# Patient Record
Sex: MALE | Race: WHITE | NOT HISPANIC OR LATINO | Employment: FULL TIME | URBAN - METROPOLITAN AREA
[De-identification: names, ages, dates, MRNs, and addresses within clinical notes are randomized per-mention and may not be internally consistent; named-entity substitution may affect disease eponyms.]

---

## 2023-03-07 PROBLEM — F33.0 MILD RECURRENT MAJOR DEPRESSION (CMS-HCC): Status: ACTIVE | Noted: 2019-10-25

## 2023-03-07 PROBLEM — F41.1 GENERALIZED ANXIETY DISORDER: Status: ACTIVE | Noted: 2019-10-25

## 2023-03-07 RX ORDER — ESCITALOPRAM OXALATE 10 MG/1
10 TABLET ORAL DAILY
COMMUNITY
Start: 2020-02-14 | End: 2023-03-08 | Stop reason: SDUPTHER

## 2023-03-08 ENCOUNTER — OFFICE VISIT (OUTPATIENT)
Dept: PRIMARY CARE | Facility: CLINIC | Age: 23
End: 2023-03-08
Payer: COMMERCIAL

## 2023-03-08 VITALS
HEIGHT: 69 IN | DIASTOLIC BLOOD PRESSURE: 70 MMHG | TEMPERATURE: 97.6 F | WEIGHT: 135 LBS | HEART RATE: 80 BPM | SYSTOLIC BLOOD PRESSURE: 112 MMHG | BODY MASS INDEX: 19.99 KG/M2 | OXYGEN SATURATION: 98 %

## 2023-03-08 DIAGNOSIS — F33.0 MILD RECURRENT MAJOR DEPRESSION (CMS-HCC): Primary | ICD-10-CM

## 2023-03-08 DIAGNOSIS — F41.1 GENERALIZED ANXIETY DISORDER: ICD-10-CM

## 2023-03-08 PROCEDURE — 99214 OFFICE O/P EST MOD 30 MIN: CPT | Performed by: FAMILY MEDICINE

## 2023-03-08 PROCEDURE — 1036F TOBACCO NON-USER: CPT | Performed by: FAMILY MEDICINE

## 2023-03-08 RX ORDER — ESCITALOPRAM OXALATE 10 MG/1
10 TABLET ORAL DAILY
Qty: 90 TABLET | Refills: 1 | Status: SHIPPED | OUTPATIENT
Start: 2023-03-08 | End: 2023-08-03 | Stop reason: SDUPTHER

## 2023-03-08 ASSESSMENT — ENCOUNTER SYMPTOMS
FATIGUE: 0
DIARRHEA: 0
CONSTIPATION: 0
DEPRESSION: 1
SHORTNESS OF BREATH: 0
NAUSEA: 0
HEADACHES: 0
DIZZINESS: 0
ABDOMINAL PAIN: 0

## 2023-03-08 NOTE — PATIENT INSTRUCTIONS
Continue your medication    Monitor your weight. If you continue to decrease in weight, follow up for recheck and possible blood work

## 2023-03-08 NOTE — PROGRESS NOTES
"Subjective   Patient ID: Mitul Venegas is a 22 y.o. male who presents for Depression and Anxiety.    Mood: well controlled.   Wt loss: lost 13 lbs since last ov. Skipping breakfast b/c would cause some loose stool. No bloody stool.     Feeling well otherwise.     DepressionPatient is not experiencing: shortness of breath.      Anxiety  Patient reports no chest pain, dizziness, nausea or shortness of breath.            Review of Systems   Constitutional:  Negative for fatigue.   Respiratory:  Negative for shortness of breath.    Cardiovascular:  Negative for chest pain.   Gastrointestinal:  Negative for abdominal pain, constipation, diarrhea and nausea.   Neurological:  Negative for dizziness and headaches.   Psychiatric/Behavioral:  Positive for depression.        Objective   /70   Pulse 80   Temp 36.4 °C (97.6 °F)   Ht 1.74 m (5' 8.5\")   Wt 61.2 kg (135 lb)   SpO2 98%   BMI 20.23 kg/m²     Physical Exam  Vitals reviewed.   Eyes:      General: No scleral icterus.     Conjunctiva/sclera: Conjunctivae normal.   Neck:      Thyroid: No thyromegaly.   Cardiovascular:      Rate and Rhythm: Normal rate and regular rhythm.   Abdominal:      Palpations: Abdomen is soft.      Tenderness: There is no abdominal tenderness.   Musculoskeletal:      Cervical back: Normal range of motion.   Skin:     General: Skin is warm and dry.   Neurological:      Mental Status: He is alert.   Psychiatric:         Mood and Affect: Mood normal.         Assessment/Plan          "

## 2023-06-27 ENCOUNTER — OFFICE VISIT (OUTPATIENT)
Dept: PRIMARY CARE | Facility: CLINIC | Age: 23
End: 2023-06-27
Payer: COMMERCIAL

## 2023-06-27 VITALS
SYSTOLIC BLOOD PRESSURE: 98 MMHG | OXYGEN SATURATION: 100 % | HEART RATE: 72 BPM | BODY MASS INDEX: 19.93 KG/M2 | TEMPERATURE: 97.4 F | WEIGHT: 133 LBS | DIASTOLIC BLOOD PRESSURE: 66 MMHG

## 2023-06-27 DIAGNOSIS — F33.0 MILD RECURRENT MAJOR DEPRESSION (CMS-HCC): ICD-10-CM

## 2023-06-27 DIAGNOSIS — F42.9 OBSESSIVE-COMPULSIVE DISORDER, UNSPECIFIED TYPE: Primary | ICD-10-CM

## 2023-06-27 DIAGNOSIS — F41.1 GENERALIZED ANXIETY DISORDER: ICD-10-CM

## 2023-06-27 PROCEDURE — 99214 OFFICE O/P EST MOD 30 MIN: CPT | Performed by: FAMILY MEDICINE

## 2023-06-27 PROCEDURE — 1036F TOBACCO NON-USER: CPT | Performed by: FAMILY MEDICINE

## 2023-06-27 NOTE — PATIENT INSTRUCTIONS
You were referred to behavioral health    Increase your med to 15mg. Notify office if you are tolerating    Return in 4-6 weeks, sooner if needed

## 2023-06-27 NOTE — PROGRESS NOTES
"Subjective   Patient ID: Mitul Venegas is a 22 y.o. male who presents for Anxiety (Recheck. Discuss referral to psychiatrist. ).    HPI     Mood: fair control. Seeing counselor. Does not feel helping. No SI/HI. Feeling anxious and developed \"germ phobia.\" Has been washing hands more. Would like to see psych. Occ pt does not want to touch things due germ fear.appetite has been down. Eats 1 large meal a day. Often skips breakfast because wakes up late. Has good energy overall. Not currently working. Plans to go back to school in fall. Feeling down.     Review of Systems   Constitutional:  Negative for fatigue.   Eyes:  Negative for visual disturbance.   Respiratory:  Negative for shortness of breath.    Cardiovascular:  Negative for chest pain and palpitations.   Gastrointestinal:  Negative for abdominal pain, blood in stool, constipation, diarrhea, nausea and vomiting.   Endocrine: Negative for cold intolerance, heat intolerance, polydipsia, polyphagia and polyuria.   Genitourinary:  Negative for difficulty urinating and dysuria.   Musculoskeletal:  Negative for arthralgias and myalgias.   Skin:  Negative for rash.   Neurological:  Negative for dizziness and headaches.   Psychiatric/Behavioral:  Negative for sleep disturbance.        Objective   BP 98/66   Pulse 72   Temp 36.3 °C (97.4 °F) (Temporal)   Wt 60.3 kg (133 lb)   SpO2 100%   BMI 19.93 kg/m²     Physical Exam  Vitals and nursing note reviewed.   Constitutional:       General: He is not in acute distress.     Appearance: Normal appearance. He is not toxic-appearing.   HENT:      Head: Normocephalic.   Eyes:      General: No scleral icterus.     Pupils: Pupils are equal, round, and reactive to light.   Cardiovascular:      Rate and Rhythm: Normal rate and regular rhythm.      Pulses: Normal pulses.      Heart sounds: No murmur heard.  Pulmonary:      Effort: Pulmonary effort is normal. No respiratory distress.      Breath sounds: Normal breath " sounds.   Abdominal:      General: Bowel sounds are normal.      Palpations: Abdomen is soft.      Tenderness: There is no abdominal tenderness. There is no guarding.   Musculoskeletal:         General: No tenderness.      Cervical back: Neck supple.   Lymphadenopathy:      Cervical: No cervical adenopathy.   Skin:     General: Skin is warm.   Neurological:      General: No focal deficit present.      Mental Status: He is alert.      Cranial Nerves: No cranial nerve deficit.   Psychiatric:         Mood and Affect: Mood normal.         Assessment/Plan   Problem List Items Addressed This Visit          Mental Health    Generalized anxiety disorder    Relevant Orders    Referral to [facility]    Mild recurrent major depression (CMS/HCC)    Relevant Orders    Referral to [facility]     Other Visit Diagnoses       Obsessive-compulsive disorder, unspecified type    -  Primary    Relevant Orders    Referral to [facility]        Rec pt follow up with counseling. Referred to behavioral health for now. May need to see psych. Pt agrees to increase med for now. Pt only wants to increase to 15 mg. Rec regular meals

## 2023-06-28 ASSESSMENT — ENCOUNTER SYMPTOMS
VOMITING: 0
ABDOMINAL PAIN: 0
DIFFICULTY URINATING: 0
ARTHRALGIAS: 0
SLEEP DISTURBANCE: 0
POLYDIPSIA: 0
HEADACHES: 0
MYALGIAS: 0
DIARRHEA: 0
DIZZINESS: 0
BLOOD IN STOOL: 0
NAUSEA: 0
DYSURIA: 0
POLYPHAGIA: 0
SHORTNESS OF BREATH: 0
PALPITATIONS: 0
CONSTIPATION: 0
FATIGUE: 0

## 2023-08-03 DIAGNOSIS — F41.1 GENERALIZED ANXIETY DISORDER: ICD-10-CM

## 2023-08-03 DIAGNOSIS — F33.0 MILD RECURRENT MAJOR DEPRESSION (CMS-HCC): ICD-10-CM

## 2023-08-03 RX ORDER — ESCITALOPRAM OXALATE 5 MG/1
5 TABLET ORAL DAILY
COMMUNITY
End: 2023-08-03 | Stop reason: SDUPTHER

## 2023-08-03 NOTE — TELEPHONE ENCOUNTER
Pt called rx line @ 3:42pm requesting RF on Lexapro 10 and 5mg. [facility]    Next OV 8/10/23  Pt requesting both 5mg and 10mg  Please advise./JW

## 2023-08-08 RX ORDER — ESCITALOPRAM OXALATE 10 MG/1
10 TABLET ORAL DAILY
Qty: 30 TABLET | Refills: 0 | Status: SHIPPED | OUTPATIENT
Start: 2023-08-08 | End: 2023-08-10 | Stop reason: DRUGHIGH

## 2023-08-08 RX ORDER — ESCITALOPRAM OXALATE 5 MG/1
5 TABLET ORAL DAILY
Qty: 30 TABLET | Refills: 0 | Status: SHIPPED | OUTPATIENT
Start: 2023-08-08 | End: 2023-08-10 | Stop reason: DRUGHIGH

## 2023-08-10 ENCOUNTER — OFFICE VISIT (OUTPATIENT)
Dept: PRIMARY CARE | Facility: CLINIC | Age: 23
End: 2023-08-10
Payer: COMMERCIAL

## 2023-08-10 VITALS
TEMPERATURE: 97.7 F | DIASTOLIC BLOOD PRESSURE: 82 MMHG | BODY MASS INDEX: 20.38 KG/M2 | HEART RATE: 84 BPM | SYSTOLIC BLOOD PRESSURE: 122 MMHG | OXYGEN SATURATION: 97 % | WEIGHT: 136 LBS

## 2023-08-10 DIAGNOSIS — F41.1 GENERALIZED ANXIETY DISORDER: ICD-10-CM

## 2023-08-10 DIAGNOSIS — F33.0 MILD RECURRENT MAJOR DEPRESSION (CMS-HCC): Primary | ICD-10-CM

## 2023-08-10 PROCEDURE — 1036F TOBACCO NON-USER: CPT | Performed by: FAMILY MEDICINE

## 2023-08-10 PROCEDURE — 99214 OFFICE O/P EST MOD 30 MIN: CPT | Performed by: FAMILY MEDICINE

## 2023-08-10 RX ORDER — ESCITALOPRAM OXALATE 20 MG/1
20 TABLET ORAL DAILY
Qty: 30 TABLET | Refills: 1 | Status: SHIPPED | OUTPATIENT
Start: 2023-08-10 | End: 2023-10-16

## 2023-08-10 ASSESSMENT — ENCOUNTER SYMPTOMS
DIFFICULTY URINATING: 0
DYSURIA: 0
NAUSEA: 0
PALPITATIONS: 0
ABDOMINAL PAIN: 0
DIZZINESS: 0
VOMITING: 0
SHORTNESS OF BREATH: 0
DIARRHEA: 0
FATIGUE: 0
BLOOD IN STOOL: 0
CONSTIPATION: 0
SLEEP DISTURBANCE: 0
ARTHRALGIAS: 0
HEADACHES: 0
MYALGIAS: 0

## 2023-08-10 NOTE — PATIENT INSTRUCTIONS
Increase lexapro    If no improvement, ok to call for zoloft of paxi    Try to schedule counseling    Follow up in 4-6 weeks, sooner if needed

## 2023-08-11 NOTE — PROGRESS NOTES
"Subjective   Patient ID: Mitul Venegas is a 22 y.o. male who presents for OCD (Obsessive-Compulsive Disorder) (Recheck).    HPI   Mood: no sig change with 15mg. Still isolating self and feels anxious. No SI/HI. Still concerned about germs. \"Does not leave room\" per mom. Appetite ok. Washing hands a lot. Obsessing over cleanliness. Trying to get into counseling.     Review of Systems   Constitutional:  Negative for fatigue.   Eyes:  Negative for visual disturbance.   Respiratory:  Negative for shortness of breath.    Cardiovascular:  Negative for chest pain and palpitations.   Gastrointestinal:  Negative for abdominal pain, blood in stool, constipation, diarrhea, nausea and vomiting.   Genitourinary:  Negative for difficulty urinating and dysuria.   Musculoskeletal:  Negative for arthralgias and myalgias.   Skin:  Negative for rash.   Neurological:  Negative for dizziness and headaches.   Psychiatric/Behavioral:  Negative for sleep disturbance.        Objective   /82   Pulse 84   Temp 36.5 °C (97.7 °F)   Wt 61.7 kg (136 lb)   SpO2 97%   BMI 20.38 kg/m²     Physical Exam  Vitals and nursing note reviewed.   Constitutional:       General: He is not in acute distress.     Appearance: Normal appearance. He is not toxic-appearing.   HENT:      Head: Normocephalic.      Right Ear: Tympanic membrane normal.      Left Ear: Tympanic membrane normal.      Nose: Nose normal.      Mouth/Throat:      Pharynx: Oropharynx is clear.   Eyes:      Pupils: Pupils are equal, round, and reactive to light.   Cardiovascular:      Rate and Rhythm: Normal rate and regular rhythm.      Pulses: Normal pulses.      Heart sounds: No murmur heard.  Pulmonary:      Effort: Pulmonary effort is normal. No respiratory distress.      Breath sounds: Normal breath sounds.   Abdominal:      General: Bowel sounds are normal.      Palpations: Abdomen is soft.      Tenderness: There is no abdominal tenderness. There is no guarding. "   Musculoskeletal:         General: No tenderness.      Right lower leg: No edema.      Left lower leg: No edema.   Lymphadenopathy:      Cervical: No cervical adenopathy.   Skin:     General: Skin is warm.   Neurological:      General: No focal deficit present.      Mental Status: He is alert.      Cranial Nerves: No cranial nerve deficit.   Psychiatric:         Mood and Affect: Mood normal.         Assessment/Plan   Problem List Items Addressed This Visit          Mental Health    Generalized anxiety disorder    Relevant Medications    escitalopram (Lexapro) 20 mg tablet    Mild recurrent major depression (CMS/HCC) - Primary    Relevant Medications    escitalopram (Lexapro) 20 mg tablet     Delcines swtich to paxil or zoloft. Pt prefers to max out lexapro. Rec counseling. If no improvement, pt to call for new med

## 2023-09-11 ENCOUNTER — APPOINTMENT (OUTPATIENT)
Dept: PRIMARY CARE | Facility: CLINIC | Age: 23
End: 2023-09-11
Payer: COMMERCIAL

## 2023-10-13 ENCOUNTER — TELEPHONE (OUTPATIENT)
Dept: PRIMARY CARE | Facility: CLINIC | Age: 23
End: 2023-10-13
Payer: COMMERCIAL

## 2023-10-13 NOTE — TELEPHONE ENCOUNTER
Patient was in and saw IEL was placed on Lexapro he wants to switch it to the Paxil they talked about him able to call in to switch medicine. [facility] is aware IEL not here until Monday.

## 2023-10-16 DIAGNOSIS — F41.1 GENERALIZED ANXIETY DISORDER: Primary | ICD-10-CM

## 2023-10-16 DIAGNOSIS — F33.0 MILD RECURRENT MAJOR DEPRESSION (CMS-HCC): ICD-10-CM

## 2023-10-16 RX ORDER — PAROXETINE HYDROCHLORIDE 20 MG/1
20 TABLET, FILM COATED ORAL EVERY MORNING
Qty: 30 TABLET | Refills: 1 | Status: SHIPPED | OUTPATIENT
Start: 2023-10-16 | End: 2023-11-15 | Stop reason: SDUPTHER

## 2023-11-15 ENCOUNTER — OFFICE VISIT (OUTPATIENT)
Dept: PRIMARY CARE | Facility: CLINIC | Age: 23
End: 2023-11-15
Payer: COMMERCIAL

## 2023-11-15 VITALS
TEMPERATURE: 97.5 F | OXYGEN SATURATION: 97 % | SYSTOLIC BLOOD PRESSURE: 118 MMHG | BODY MASS INDEX: 21.28 KG/M2 | DIASTOLIC BLOOD PRESSURE: 76 MMHG | HEART RATE: 86 BPM | WEIGHT: 142 LBS

## 2023-11-15 DIAGNOSIS — F42.9 OBSESSIVE-COMPULSIVE DISORDER, UNSPECIFIED TYPE: ICD-10-CM

## 2023-11-15 DIAGNOSIS — R21 RASH: Primary | ICD-10-CM

## 2023-11-15 DIAGNOSIS — F33.0 MILD RECURRENT MAJOR DEPRESSION (CMS-HCC): ICD-10-CM

## 2023-11-15 DIAGNOSIS — F41.1 GENERALIZED ANXIETY DISORDER: ICD-10-CM

## 2023-11-15 PROCEDURE — 1036F TOBACCO NON-USER: CPT | Performed by: FAMILY MEDICINE

## 2023-11-15 PROCEDURE — 99214 OFFICE O/P EST MOD 30 MIN: CPT | Performed by: FAMILY MEDICINE

## 2023-11-15 RX ORDER — PAROXETINE HYDROCHLORIDE 20 MG/1
20 TABLET, FILM COATED ORAL EVERY MORNING
Qty: 90 TABLET | Refills: 0 | Status: SHIPPED | OUTPATIENT
Start: 2023-11-15 | End: 2024-02-22

## 2023-11-15 RX ORDER — MUPIROCIN 20 MG/G
OINTMENT TOPICAL 3 TIMES DAILY
Qty: 22 G | Refills: 0 | Status: SHIPPED | OUTPATIENT
Start: 2023-11-15 | End: 2023-11-25

## 2023-11-15 ASSESSMENT — ENCOUNTER SYMPTOMS
HEADACHES: 0
PALPITATIONS: 0
FATIGUE: 0
DIZZINESS: 0
VOMITING: 0
NAUSEA: 0
DIARRHEA: 0
SHORTNESS OF BREATH: 0
ABDOMINAL PAIN: 0
SLEEP DISTURBANCE: 0

## 2023-11-15 NOTE — PROGRESS NOTES
Subjective   Patient ID: Mitul Venegas is a 23 y.o. male who presents for OCD (Obsessive-Compulsive Disorder) and multiple issues.     HPI   Mood: no sig difference on paxil but likes med. Would like to increase dose. Feels has potential to improve on higher dose. Still washing hands constantly and has fear of germs. +anxiety when going into public. Does not like to leave house. Sleeping well. Good appetite. Currently not working and cont to put off school. Had 1 counseling apt but looking for new counselor.     Rash: reports recurrent cysts near ears. Left side sl inflammed. No F/Chills.   Review of Systems   Constitutional:  Negative for fatigue.   HENT: Negative.     Eyes:  Negative for visual disturbance.   Respiratory:  Negative for shortness of breath.    Cardiovascular:  Negative for chest pain and palpitations.   Gastrointestinal:  Negative for abdominal pain, diarrhea, nausea and vomiting.   Neurological:  Negative for dizziness and headaches.   Psychiatric/Behavioral:  Negative for sleep disturbance.        Objective   /76   Pulse 86   Temp 36.4 °C (97.5 °F)   Wt 64.4 kg (142 lb)   SpO2 97%   BMI 21.28 kg/m²     Physical Exam  Vitals and nursing note reviewed.   Constitutional:       General: He is not in acute distress.     Appearance: Normal appearance. He is not toxic-appearing.   HENT:      Head: Normocephalic.   Eyes:      General: No scleral icterus.     Pupils: Pupils are equal, round, and reactive to light.   Cardiovascular:      Rate and Rhythm: Normal rate and regular rhythm.      Heart sounds: No murmur heard.  Pulmonary:      Effort: Pulmonary effort is normal. No respiratory distress.      Breath sounds: Normal breath sounds.   Abdominal:      General: Bowel sounds are normal.      Palpations: Abdomen is soft.      Tenderness: There is no abdominal tenderness. There is no guarding.   Musculoskeletal:      Right lower leg: No edema.      Left lower leg: No edema.    Lymphadenopathy:      Cervical: No cervical adenopathy.   Skin:     General: Skin is warm.      Comments: Mild inflammation under left ear lobe-cystic lesion just under 1 cm. NTTP.    Neurological:      General: No focal deficit present.      Mental Status: He is alert.      Cranial Nerves: No cranial nerve deficit.   Psychiatric:         Mood and Affect: Mood normal.         Assessment/Plan   Problem List Items Addressed This Visit             ICD-10-CM    Generalized anxiety disorder F41.1    Relevant Medications    PARoxetine (Paxil) 20 mg tablet    Mild recurrent major depression (CMS/HCC) F33.0    Relevant Medications    PARoxetine (Paxil) 20 mg tablet     Other Visit Diagnoses         Codes    Rash    -  Primary R21    Relevant Medications    mupirocin (Bactroban) 2 % ointment    Obsessive-compulsive disorder, unspecified type     F42.9        Rec counseling. Increase paxil. Can titrate to 40mg. Use topical abx for now. Monitor for infection.

## 2023-11-15 NOTE — PATIENT INSTRUCTIONS
Ok to take full pill paxil(20mg). Ok to increase to 40mg but notify office    Trial topical antibiotic    Recommend counseling.     Return in 3 months, sooner if needed

## 2023-12-14 ENCOUNTER — APPOINTMENT (OUTPATIENT)
Dept: PRIMARY CARE | Facility: CLINIC | Age: 23
End: 2023-12-14
Payer: COMMERCIAL

## 2024-02-22 ENCOUNTER — OFFICE VISIT (OUTPATIENT)
Dept: PRIMARY CARE | Facility: CLINIC | Age: 24
End: 2024-02-22
Payer: COMMERCIAL

## 2024-02-22 VITALS
TEMPERATURE: 97.2 F | WEIGHT: 150 LBS | SYSTOLIC BLOOD PRESSURE: 114 MMHG | HEART RATE: 83 BPM | DIASTOLIC BLOOD PRESSURE: 80 MMHG | OXYGEN SATURATION: 97 % | BODY MASS INDEX: 22.48 KG/M2

## 2024-02-22 DIAGNOSIS — F41.1 GENERALIZED ANXIETY DISORDER: ICD-10-CM

## 2024-02-22 DIAGNOSIS — F42.2 MIXED OBSESSIONAL THOUGHTS AND ACTS: Primary | ICD-10-CM

## 2024-02-22 DIAGNOSIS — F33.0 MILD RECURRENT MAJOR DEPRESSION (CMS-HCC): ICD-10-CM

## 2024-02-22 PROCEDURE — 1036F TOBACCO NON-USER: CPT | Performed by: FAMILY MEDICINE

## 2024-02-22 PROCEDURE — 99214 OFFICE O/P EST MOD 30 MIN: CPT | Performed by: FAMILY MEDICINE

## 2024-02-22 RX ORDER — PAROXETINE HYDROCHLORIDE 20 MG/1
20 TABLET, FILM COATED ORAL EVERY MORNING
Qty: 90 TABLET | Refills: 0 | Status: CANCELLED | OUTPATIENT
Start: 2024-02-22 | End: 2024-05-22

## 2024-02-22 RX ORDER — PAROXETINE HYDROCHLORIDE 40 MG/1
40 TABLET, FILM COATED ORAL EVERY MORNING
Qty: 30 TABLET | Refills: 1 | Status: SHIPPED | OUTPATIENT
Start: 2024-02-22 | End: 2024-04-23 | Stop reason: SDUPTHER

## 2024-02-22 ASSESSMENT — ENCOUNTER SYMPTOMS
SLEEP DISTURBANCE: 0
SHORTNESS OF BREATH: 0
DIARRHEA: 0
PALPITATIONS: 0
VOMITING: 0
DIZZINESS: 0
HEADACHES: 0
ABDOMINAL PAIN: 0
FATIGUE: 0
NAUSEA: 0

## 2024-04-23 ENCOUNTER — TELEMEDICINE (OUTPATIENT)
Dept: PRIMARY CARE | Facility: CLINIC | Age: 24
End: 2024-04-23
Payer: COMMERCIAL

## 2024-04-23 DIAGNOSIS — F42.2 MIXED OBSESSIONAL THOUGHTS AND ACTS: ICD-10-CM

## 2024-04-23 DIAGNOSIS — F41.1 GENERALIZED ANXIETY DISORDER: ICD-10-CM

## 2024-04-23 DIAGNOSIS — F33.0 MILD RECURRENT MAJOR DEPRESSION (CMS-HCC): ICD-10-CM

## 2024-04-23 PROCEDURE — 99214 OFFICE O/P EST MOD 30 MIN: CPT | Performed by: FAMILY MEDICINE

## 2024-04-23 PROCEDURE — 1036F TOBACCO NON-USER: CPT | Performed by: FAMILY MEDICINE

## 2024-04-23 RX ORDER — PAROXETINE HYDROCHLORIDE 40 MG/1
40 TABLET, FILM COATED ORAL EVERY MORNING
Qty: 90 TABLET | Refills: 0 | Status: SHIPPED | OUTPATIENT
Start: 2024-04-23 | End: 2024-07-22

## 2024-04-23 ASSESSMENT — ENCOUNTER SYMPTOMS
SLEEP DISTURBANCE: 0
SHORTNESS OF BREATH: 0
HEADACHES: 0
CONSTIPATION: 0
PALPITATIONS: 0
UNEXPECTED WEIGHT CHANGE: 0
NAUSEA: 0
DIARRHEA: 0
ABDOMINAL PAIN: 0
VOMITING: 0
DYSPHORIC MOOD: 0
FATIGUE: 0

## 2024-04-23 NOTE — PROGRESS NOTES
Subjective   Patient ID: Darin Venegas is a 23 y.o. male who presents for telemed visit.  Patient at home.  PCP in office.  Patient consents to treatment    HPI     Mood: Feels slightly improved on higher dose.  Tolerating without side effects.  Unsure if helping with Compulsions and obsessions.  Still washing hands.  Having some obsessive thoughts.  No SI/HI.  Appetite good.  Sleeping well.  Has not scheduled with counseling but plans to.  Pushed enrollment to fall and no longer will be starting the summer.  Currently not working.  Staying home most of the time but does leave the home    Review of Systems   Constitutional:  Negative for fatigue and unexpected weight change.   Respiratory:  Negative for shortness of breath.    Cardiovascular:  Negative for chest pain and palpitations.   Gastrointestinal:  Negative for abdominal pain, constipation, diarrhea, nausea and vomiting.   Skin:  Negative for rash.   Neurological:  Negative for headaches.   Psychiatric/Behavioral:  Negative for dysphoric mood and sleep disturbance.        Objective   There were no vitals taken for this visit.    Physical Exam  HENT:      Head: Normocephalic.      Nose:      Comments: No conversational dyspnea  Pulmonary:      Effort: No respiratory distress.   Neurological:      General: No focal deficit present.      Mental Status: He is alert.   Psychiatric:         Mood and Affect: Mood normal.         Behavior: Behavior normal.         Assessment/Plan   Problem List Items Addressed This Visit             ICD-10-CM    Generalized anxiety disorder F41.1    Relevant Medications    PARoxetine (Paxil) 40 mg tablet    Other Relevant Orders    Referral to Psychiatry    Mild recurrent major depression (CMS-HCC) F33.0    Relevant Medications    PARoxetine (Paxil) 40 mg tablet    Other Relevant Orders    Referral to Psychiatry     Other Visit Diagnoses         Codes    Mixed obsessional thoughts and acts     F42.2    Relevant Medications     PARoxetine (Paxil) 40 mg tablet    Other Relevant Orders    Referral to Psychiatry        Recommend patient see psychiatry and schedule with counseling.  Patient agreeable     Telemed: 12 minutes, 5 minutes to document.  Total 17 minutes

## 2024-04-23 NOTE — PATIENT INSTRUCTIONS
You were referred to psychiatry    Continue your current meds for now    Return in 3 months, sooner if needed

## 2024-05-21 ENCOUNTER — APPOINTMENT (OUTPATIENT)
Dept: BEHAVIORAL HEALTH | Facility: CLINIC | Age: 24
End: 2024-05-21
Payer: COMMERCIAL

## 2024-07-09 ENCOUNTER — APPOINTMENT (OUTPATIENT)
Dept: BEHAVIORAL HEALTH | Facility: CLINIC | Age: 24
End: 2024-07-09
Payer: COMMERCIAL

## 2024-07-09 VITALS
DIASTOLIC BLOOD PRESSURE: 74 MMHG | HEIGHT: 69 IN | SYSTOLIC BLOOD PRESSURE: 114 MMHG | WEIGHT: 164 LBS | BODY MASS INDEX: 24.29 KG/M2 | HEART RATE: 108 BPM

## 2024-07-09 DIAGNOSIS — F40.10 SOCIAL ANXIETY DISORDER: ICD-10-CM

## 2024-07-09 DIAGNOSIS — F33.0 MILD RECURRENT MAJOR DEPRESSION (CMS-HCC): ICD-10-CM

## 2024-07-09 DIAGNOSIS — F42.2 MIXED OBSESSIONAL THOUGHTS AND ACTS: ICD-10-CM

## 2024-07-09 DIAGNOSIS — F41.1 GENERALIZED ANXIETY DISORDER: ICD-10-CM

## 2024-07-09 PROCEDURE — 99205 OFFICE O/P NEW HI 60 MIN: CPT

## 2024-07-09 RX ORDER — FLUVOXAMINE MALEATE 25 MG/1
TABLET ORAL NIGHTLY
Qty: 67 TABLET | Refills: 1 | Status: SHIPPED | OUTPATIENT
Start: 2024-07-16 | End: 2024-08-22

## 2024-07-09 ASSESSMENT — ANXIETY QUESTIONNAIRES
GAD7 TOTAL SCORE: 5
IF YOU CHECKED OFF ANY PROBLEMS ON THIS QUESTIONNAIRE, HOW DIFFICULT HAVE THESE PROBLEMS MADE IT FOR YOU TO DO YOUR WORK, TAKE CARE OF THINGS AT HOME, OR GET ALONG WITH OTHER PEOPLE: VERY DIFFICULT
6. BECOMING EASILY ANNOYED OR IRRITABLE: NOT AT ALL
2. NOT BEING ABLE TO STOP OR CONTROL WORRYING: SEVERAL DAYS
3. WORRYING TOO MUCH ABOUT DIFFERENT THINGS: SEVERAL DAYS
7. FEELING AFRAID AS IF SOMETHING AWFUL MIGHT HAPPEN: NOT AT ALL
1. FEELING NERVOUS, ANXIOUS, OR ON EDGE: SEVERAL DAYS
5. BEING SO RESTLESS THAT IT IS HARD TO SIT STILL: SEVERAL DAYS
4. TROUBLE RELAXING: SEVERAL DAYS

## 2024-07-09 NOTE — PROGRESS NOTES
HPI  Darin Venegas is a 23 y.o. male patient with a CC Anxiety, Depression, and OCD (Obsessive-Compulsive Disorder) presenting to outpatient treatment for a scheduled psych outpatient psychiatric evaluation.   Pt identify self by name, , and address     States he was referred by PCP, Laurie Barr DO for further eval/tx of DEAN and OCD. Reports hx of DEAN (senior yr of high school, 2019), MDD and OCD (). Currently prescribed Paxil 40 mg daily.     States Paxil is not helping with his worsening social anxiety or OCD symtpoms. Tries to limit confrontation as much as possible especially from strangers, tends to hangout more with people he is more familiar with.     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 As a result, became excessively germophobic, wears long hand clothes to avoid contamination with surfaces. Washes hands excessively when in contact with various surfaces including at home. Washes hands about 20-25 times/day even without coming in contact with a surface. Abstaining from hand washing may lead to severe anxiety which have worsening over the last few months. Reports catastrophizing and ruminating over germs that may lead to contamination and these often lead to negative feelings and emotions.     Current S/Sx:  -Mood: unstable and anxious  -Depressive mood: denies  -Fatigue/Energy: good  -Feeling hope/help/worthless: feels somewhat helpless from anxiety  -Sleep: sleeps about 7-9 hours/night.   -Motivation: average   -Appetite/Weight Changes: good appetite, binge eats at times, between 6-10 pm, no weight changes  -Psychosis: denies hallucinations/delusions  -SI/HI: Denies current suicidal intent/plan  -Guns/Weapons at home: guns present at home, locked up in a safe place     -Worry excessively: reports difficulty with social anxiety including engaging in large crowds. Denies fear of leaving the house or going to the store or driving but has  a significant fear of heights.   -Difficulty controlling worry: yes  -Easily fatigued d/t worry: denies  -Poor concentration d/t worry: yes  -Sleep disturbance d/t worry: denies  -Easy irritability d/t worry: denies  -Restless / feeling on edge d/t worry: yes  -DEAN-7 (5)     Panic attacks  denies     HISTORY  PSYCH HISTORY  -Psych Hx: DEAN, MDD, OCD  -Psych Hospitalization Hx: denies  -Suicide Attempt Hx: denies  -Self-Harm/Violence Hx: denies  -Current psych meds: Paxil 40 mg daily  -Psych Med Hx: Lexapro (fixed throwing up while in school and constantly shaking leg. Unsure why it was stopped)     SUBSTANCE USE HISTORY  -Substance Use Hx: denies  -ETOH: rarely  -Tobacco: denies  -Caffeine: rarely  -Substance Abuse Treatment Hx: denies     FAMILY HISTORY  -Family Psych Hx: brother: ADHD, bipolar 2??, mom: profound OCD, maternal GF: severe dementia  -Family Suicide Hx: Paternal GGF  -Family Substance Abuse Hx: denies     SOCIAL HISTORY  -Upbringing: Grew up with both parents. Has 1 brother/sister  -Support system: good  -Trauma: denies  -Education: high school  -Work: previous  with father, stopped working, currently unemployed  -Marital Status: single  -Children: none  -Living situation: lives with parents and grandparents  -: denies  -Legal: denies      MEDICAL HISTORY  -PCP: Laurie Barr, DO  -TBI/head trauma/LOC/seizure hx: denies     REVIEW OF SYSTEMS  Review of Systems   All other systems reviewed and are negative.       PHYSICAL EXAM  Physical Exam  Psychiatric:         Attention and Perception: Attention and perception normal.         Mood and Affect: Affect normal. Mood is anxious.         Speech: Speech normal.         Behavior: Behavior normal. Behavior is cooperative.         Thought Content: Thought content normal.         Cognition and Memory: Cognition and memory normal.          IMPRESSION  Anxiety, Depression, and OCD (Obsessive-Compulsive Disorder)     Notes moderate  social and general anxiety associated to obcessive fear of dirt/contamination and compulsive handwashing and avoidance of certain places based on subjective/objective assessment. Denies feeling depressed. Notes stable mood. No hallucinations/delusion/dwayne/hypomania/SI reported. Appetite is good and sleeps well. No side effects or substance use concerns at this time. Discussed to stop Paxil d/t ineffectiveness in managing symptoms and switching to Luvox for effective management of anxiety and ocd type symptoms.      SI/HI ASSESSMENT  -Risk Assessment: Mitul Venegas is currently a low acute risk of suicide and self-harm due to no past suicide attempt(s) and not currently endorsing thoughts of suicide.   -Suicidal Risk Factors: , male, and unmarried/single  -Protective Factors: strong coping skills and hopefulness/future orientation  -Plan to Reduce Risk: increase coping skills .     PLAN  Reviewed diagnostic impression including subjective and objective data and provided education about Anxiety, DEAN, and OCD, etiology, treatment recommendations including medication, therapy, course of treatment and prognosis. Patient amenable to treatment plan.      Dx: Anxiety, Depression, and OCD (Obsessive-Compulsive Disorder)  START Luvox 25 mg daily, take 1 tabs (starting 07/16-7/22), then 2 tabs (starting 7/23-next visit)  DECREASE Paxil 20 mg daily x 1 wk, then 10 mg daily, then STOP     Reviewed r/b/a, possible side effects of the medication. Client is aware about the benefit outweighs the risk.     Psychotherapy: none    Labs reviewed      -Follow-up with this provider in 5 weeks.    - Follow up with physical health providers as scheduled  -Risks/benefits/assessment of medication interventions discussed with pt; pt agreeable to plan. Will continue to monitor for symptoms mgmt and SEs and adjust plan as needed.  -MI to increase coping skills/behavior regulation.  -Safety plan reviewed.  -Call  Psychiatry at  [phone] with issues.  -For [facility] residents, [facility] is a 24/7 hotline you can call for assistance at [phone]) [phone]. Please call 911 or go to your closest Emergency Room if you feel worse. This includes thoughts of hurting yourself or anyone else, or having other troubles such as hearing voices, seeing visions, or having new and scary thoughts about the people around you.

## 2024-07-30 DIAGNOSIS — F42.2 MIXED OBSESSIONAL THOUGHTS AND ACTS: ICD-10-CM

## 2024-07-30 DIAGNOSIS — F33.0 MILD RECURRENT MAJOR DEPRESSION (CMS-HCC): ICD-10-CM

## 2024-07-30 DIAGNOSIS — F41.1 GENERALIZED ANXIETY DISORDER: ICD-10-CM

## 2024-07-30 DIAGNOSIS — F40.10 SOCIAL ANXIETY DISORDER: ICD-10-CM

## 2024-07-30 RX ORDER — SERTRALINE HYDROCHLORIDE 50 MG/1
TABLET, FILM COATED ORAL
Qty: 30 TABLET | Refills: 1 | Status: SHIPPED | OUTPATIENT
Start: 2024-07-30 | End: 2024-09-05

## 2024-08-12 PROBLEM — F33.0 MILD RECURRENT MAJOR DEPRESSION (CMS-HCC): Status: RESOLVED | Noted: 2019-10-25 | Resolved: 2024-08-12

## 2024-08-12 PROBLEM — F42.9 OCD (OBSESSIVE COMPULSIVE DISORDER): Status: ACTIVE | Noted: 2021-05-15

## 2024-08-14 ENCOUNTER — APPOINTMENT (OUTPATIENT)
Dept: BEHAVIORAL HEALTH | Facility: CLINIC | Age: 24
End: 2024-08-14
Payer: COMMERCIAL

## 2024-08-14 DIAGNOSIS — F33.0 MILD RECURRENT MAJOR DEPRESSION (CMS-HCC): ICD-10-CM

## 2024-08-14 DIAGNOSIS — F42.2 MIXED OBSESSIONAL THOUGHTS AND ACTS: ICD-10-CM

## 2024-08-14 DIAGNOSIS — F41.1 GENERALIZED ANXIETY DISORDER: ICD-10-CM

## 2024-08-14 DIAGNOSIS — F40.10 SOCIAL ANXIETY DISORDER: ICD-10-CM

## 2024-08-14 PROCEDURE — 99214 OFFICE O/P EST MOD 30 MIN: CPT

## 2024-08-14 NOTE — PROGRESS NOTES
HPI  Darin Venegas is a 23 y.o. male patient with a CC Anxiety, Depression, OCD (Obsessive-Compulsive Disorder), and AD (Adjustment Disorder) presenting to outpatient treatment for a scheduled psych outpatient psychiatric evaluation.   Pt identify self by name, , and address     24  Social anxiety has improved, appetite is good. Sleep has improved. No irritability/improved mood. Denies SI/hallucinations/delusions/dwayne/hypomania. No substance use, no side effects noticed. No real different with OCD.  Mom states he is starting school soon, would like him to engage in counseling    Current S/Sx:  -Mood: unstable and anxious  -Depressive mood: denies  -Fatigue/Energy: good  -Feeling hope/help/worthless: feels somewhat helpless from anxiety  -Sleep: sleeps about 7-9 hours/night.   -Motivation: average   -Appetite/Weight Changes: good appetite, binge eats at times, between 6-10 pm, no weight changes  -Psychosis: denies hallucinations/delusions  -SI/HI: Denies current suicidal intent/plan  -Guns/Weapons at home: guns present at home, locked up in a safe place     -Worry excessively: reports difficulty with social anxiety including engaging in large crowds. Denies fear of leaving the house or going to the store or driving but has a significant fear of heights.   -Difficulty controlling worry: yes  -Easily fatigued d/t worry: denies  -Poor concentration d/t worry: yes  -Sleep disturbance d/t worry: denies  -Easy irritability d/t worry: denies  -Restless / feeling on edge d/t worry: yes  -DEAN-7 (5)     Panic attacks  denies     HISTORY  PSYCH HISTORY  -Psych Hx: DEAN, MDD, OCD  -Psych Hospitalization Hx: denies  -Suicide Attempt Hx: denies  -Self-Harm/Violence Hx: denies  -Current psych meds: Paxil 40 mg daily  -Psych Med Hx: Lexapro (fixed throwing up while in school and constantly shaking leg. Unsure why it was stopped)     SUBSTANCE USE HISTORY  -Substance Use Hx: denies  -ETOH: rarely  -Tobacco:  denies  -Caffeine: rarely  -Substance Abuse Treatment Hx: denies     FAMILY HISTORY  -Family Psych Hx: brother: ADHD, bipolar 2??, mom: profound OCD, maternal GF: severe dementia  -Family Suicide Hx: Paternal GGF  -Family Substance Abuse Hx: denies     SOCIAL HISTORY  -Upbringing: Grew up with both parents. Has 1 brother/sister  -Support system: good  -Trauma: denies  -Education: high school  -Work: previous  with father, stopped working, currently unemployed  -Marital Status: single  -Children: none  -Living situation: lives with parents and grandparents  -: denies  -Legal: denies      MEDICAL HISTORY  -PCP: Laurie Barr, DO  -TBI/head trauma/LOC/seizure hx: denies     REVIEW OF SYSTEMS  Review of Systems   All other systems reviewed and are negative.       PHYSICAL EXAM  Physical Exam  Psychiatric:         Attention and Perception: Attention and perception normal.         Mood and Affect: Affect normal. Mood is anxious.         Speech: Speech normal.         Behavior: Behavior normal. Behavior is cooperative.         Thought Content: Thought content normal.         Cognition and Memory: Cognition and memory normal.          IMPRESSION  Anxiety, Depression, OCD (Obsessive-Compulsive Disorder), and AD (Adjustment Disorder)    Notes some improvement with social and general anxiety but obsessive/compulsive thoughts/actions remain present, to a lesser degree.  Patient does not wash hands as much obsessed with germs/dirt/contamination is much, however symptoms are still significantly interfere with daily activities.  Denies feeling depressed. Notes stable mood. No hallucinations/delusion/dwayne/hypomania/SI reported. Appetite is good and sleep has improved. No side effects or substance use concerns at this time. Discussed to continue current Luvox dose given that an increment to 50 mg was just initiated a day ago.  Will reassess in 3 weeks     SI/HI ASSESSMENT  -Risk Assessment: Mitul THOMPSON  Theo is currently a low acute risk of suicide and self-harm due to no past suicide attempt(s) and not currently endorsing thoughts of suicide.   -Suicidal Risk Factors: , male, and unmarried/single  -Protective Factors: strong coping skills and hopefulness/future orientation  -Plan to Reduce Risk: increase coping skills .     PLAN  Reviewed diagnostic impression including subjective and objective data and provided education about Anxiety, DEAN, and OCD, etiology, treatment recommendations including medication, therapy, course of treatment and prognosis. Patient amenable to treatment plan.      Dx: Anxiety, Depression, OCD (Obsessive-Compulsive Disorder), and AD (Adjustment Disorder)  CONTINUE Luvox 25 mg, take 2 tabs (50 mg) daily as instructed     Reviewed r/b/a, possible side effects of the medication. Client is aware about the benefit outweighs the risk.     Psychotherapy: none    Labs reviewed      -Follow-up with this provider in 3 weeks.    - Follow up with physical health providers as scheduled  -Risks/benefits/assessment of medication interventions discussed with pt; pt agreeable to plan. Will continue to monitor for symptoms mgmt and SEs and adjust plan as needed.  -MI to increase coping skills/behavior regulation.  -Safety plan reviewed.  -Call  Psychiatry at [phone] with issues.  -For [facility] residents, [employer] is a 24/7 hotline you can call for assistance at [phone] 990-4269. Please call 911 or go to your closest Emergency Room if you feel worse. This includes thoughts of hurting yourself or anyone else, or having other troubles such as hearing voices, seeing visions, or having new and scary thoughts about the people around you.

## 2024-08-23 DIAGNOSIS — F41.1 GENERALIZED ANXIETY DISORDER: ICD-10-CM

## 2024-08-23 DIAGNOSIS — F40.10 SOCIAL ANXIETY DISORDER: ICD-10-CM

## 2024-08-23 DIAGNOSIS — F33.0 MILD RECURRENT MAJOR DEPRESSION (CMS-HCC): ICD-10-CM

## 2024-08-23 DIAGNOSIS — F42.2 MIXED OBSESSIONAL THOUGHTS AND ACTS: ICD-10-CM

## 2024-08-26 RX ORDER — SERTRALINE HYDROCHLORIDE 50 MG/1
TABLET, FILM COATED ORAL
Qty: 90 TABLET | Refills: 1 | Status: SHIPPED | OUTPATIENT
Start: 2024-08-26 | End: 2024-10-01

## 2024-09-04 ENCOUNTER — APPOINTMENT (OUTPATIENT)
Dept: BEHAVIORAL HEALTH | Facility: CLINIC | Age: 24
End: 2024-09-04
Payer: COMMERCIAL

## 2024-09-04 DIAGNOSIS — F42.2 MIXED OBSESSIONAL THOUGHTS AND ACTS: ICD-10-CM

## 2024-09-04 DIAGNOSIS — F41.1 GENERALIZED ANXIETY DISORDER: ICD-10-CM

## 2024-09-04 DIAGNOSIS — F33.0 MILD RECURRENT MAJOR DEPRESSION (CMS-HCC): ICD-10-CM

## 2024-09-04 DIAGNOSIS — F40.10 SOCIAL ANXIETY DISORDER: ICD-10-CM

## 2024-09-04 PROCEDURE — 99214 OFFICE O/P EST MOD 30 MIN: CPT

## 2024-09-04 RX ORDER — FLUVOXAMINE MALEATE 100 MG/1
100 TABLET, COATED ORAL NIGHTLY
Qty: 30 TABLET | Refills: 2 | Status: SHIPPED | OUTPATIENT
Start: 2024-09-04 | End: 2024-09-06

## 2024-09-04 NOTE — PROGRESS NOTES
HPI  Darin Venegas is a 24 y.o. male patient with a CC Anxiety (General and social), Depression (Mild, in remission), and OCD (Obsessive-Compulsive Disorder) presenting to outpatient treatment for a scheduled psych outpatient psychiatric evaluation.   Pt identify self by name, , and address     2023  States meds seems to be working and he doesn't feel as picky about things but dose doesn't seem optimal. Anxiety and depression have remained relatively the same. Sleep and appetite are normal. Denies SI/hallucinations/delusions/dwayne/hypomania. No substance use. No irritability/improved mood.    Current S/Sx:  -Mood: unstable and anxious  -Depressive mood: denies  -Fatigue/Energy: good  -Feeling hope/help/worthless: feels somewhat helpless from anxiety  -Sleep: sleeps about 7-9 hours/night.   -Motivation: average   -Appetite/Weight Changes: good appetite, binge eats at times, between 6-10 pm, no weight changes  -Psychosis: denies hallucinations/delusions  -SI/HI: Denies current suicidal intent/plan  -Guns/Weapons at home: guns present at home, locked up in a safe place     -Worry excessively: reports difficulty with social anxiety including engaging in large crowds. Denies fear of leaving the house or going to the store or driving but has a significant fear of heights.   -Difficulty controlling worry: yes  -Easily fatigued d/t worry: denies  -Poor concentration d/t worry: yes  -Sleep disturbance d/t worry: denies  -Easy irritability d/t worry: denies  -Restless / feeling on edge d/t worry: yes  -DEAN-7 (5)     Panic attacks  denies     HISTORY  PSYCH HISTORY  -Psych Hx: DEAN, MDD, OCD  -Psych Hospitalization Hx: denies  -Suicide Attempt Hx: denies  -Self-Harm/Violence Hx: denies  -Current psych meds: Paxil 40 mg daily  -Psych Med Hx: Lexapro (fixed throwing up while in school and constantly shaking leg. Unsure why it was stopped)     SUBSTANCE USE HISTORY  -Substance Use Hx: denies  -ETOH: rarely  -Tobacco:  denies  -Caffeine: rarely  -Substance Abuse Treatment Hx: denies     FAMILY HISTORY  -Family Psych Hx: brother: ADHD, bipolar 2??, mom: profound OCD, maternal GF: severe dementia  -Family Suicide Hx: Paternal GGF  -Family Substance Abuse Hx: denies     SOCIAL HISTORY  -Upbringing: Grew up with both parents. Has 1 brother/sister  -Support system: good  -Trauma: denies  -Education: high school  -Work: previous  with father, stopped working, currently unemployed  -Marital Status: single  -Children: none  -Living situation: lives with parents and grandparents  -: denies  -Legal: denies      MEDICAL HISTORY  -PCP: Laurie Barr, DO  -TBI/head trauma/LOC/seizure hx: denies     REVIEW OF SYSTEMS  Review of Systems   All other systems reviewed and are negative.       PHYSICAL EXAM  Physical Exam  Psychiatric:         Attention and Perception: Attention and perception normal.         Mood and Affect: Affect normal. Mood is anxious.         Speech: Speech normal.         Behavior: Behavior normal. Behavior is cooperative.         Thought Content: Thought content normal.         Cognition and Memory: Cognition and memory normal.          IMPRESSION  Anxiety (General and social), Depression (Mild, in remission), and OCD (Obsessive-Compulsive Disorder)    Notes continued improvement with social and general anxiety and minimally improvement with obsessive/compulsive thoughts/actions but not optimal. Distress related to obsession with germs/dirt/contamination remain present to a lesser degree and continue to interfere with daily activities.  Denies feeling depressed. Notes stable mood. No hallucinations/delusion/dwayne/hypomania/SI reported. Appetite and sleep are normal. No side effects or substance use concerns at this time. Discussed to increase current Luvox and reassess in 5 weeks.      SI/HI ASSESSMENT  -Risk Assessment: Mitul Venegas is currently a low acute risk of suicide and self-harm due  to no past suicide attempt(s) and not currently endorsing thoughts of suicide.   -Suicidal Risk Factors: , male, and unmarried/single  -Protective Factors: strong coping skills and hopefulness/future orientation  -Plan to Reduce Risk: increase coping skills .     PLAN  Reviewed diagnostic impression including subjective and objective data and provided education about Anxiety, DEAN, and OCD, etiology, treatment recommendations including medication, therapy, course of treatment and prognosis. Patient amenable to treatment plan.      Dx: Anxiety (General and social), Depression (Mild, in remission), and OCD (Obsessive-Compulsive Disorder)  INCREASE Luvox 100 mg daily     Reviewed r/b/a, possible side effects of the medication. Client is aware about the benefit outweighs the risk.     Psychotherapy: Referral sent    Labs reviewed      -Follow-up with this provider in 5 weeks.    - Follow up with physical health providers as scheduled  -Risks/benefits/assessment of medication interventions discussed with pt; pt agreeable to plan. Will continue to monitor for symptoms mgmt and SEs and adjust plan as needed.  -MI to increase coping skills/behavior regulation.  -Safety plan reviewed.  -Call  Psychiatry at [phone] with issues.  -For [facility] residents, [employer] is a 24/7 hotline you can call for assistance at [phone]) [phone]. Please call 911 or go to your closest Emergency Room if you feel worse. This includes thoughts of hurting yourself or anyone else, or having other troubles such as hearing voices, seeing visions, or having new and scary thoughts about the people around you.

## 2024-09-05 DIAGNOSIS — F33.0 MILD RECURRENT MAJOR DEPRESSION (CMS-HCC): ICD-10-CM

## 2024-09-05 DIAGNOSIS — F42.2 MIXED OBSESSIONAL THOUGHTS AND ACTS: ICD-10-CM

## 2024-09-05 DIAGNOSIS — F40.10 SOCIAL ANXIETY DISORDER: ICD-10-CM

## 2024-09-05 DIAGNOSIS — F41.1 GENERALIZED ANXIETY DISORDER: ICD-10-CM

## 2024-09-06 RX ORDER — FLUVOXAMINE MALEATE 100 MG/1
100 TABLET, COATED ORAL NIGHTLY
Qty: 90 TABLET | Refills: 1 | Status: SHIPPED | OUTPATIENT
Start: 2024-09-06 | End: 2025-03-05

## 2024-10-09 ENCOUNTER — APPOINTMENT (OUTPATIENT)
Dept: BEHAVIORAL HEALTH | Facility: CLINIC | Age: 24
End: 2024-10-09
Payer: COMMERCIAL

## 2024-10-09 DIAGNOSIS — F42.2 MIXED OBSESSIONAL THOUGHTS AND ACTS: ICD-10-CM

## 2024-10-09 DIAGNOSIS — F33.0 MILD RECURRENT MAJOR DEPRESSION (CMS-HCC): ICD-10-CM

## 2024-10-09 DIAGNOSIS — F41.1 GENERALIZED ANXIETY DISORDER: ICD-10-CM

## 2024-10-09 PROCEDURE — 99214 OFFICE O/P EST MOD 30 MIN: CPT

## 2024-10-09 RX ORDER — SERTRALINE HYDROCHLORIDE 100 MG/1
150 TABLET, FILM COATED ORAL DAILY
Qty: 45 TABLET | Refills: 2 | Status: SHIPPED | OUTPATIENT
Start: 2024-10-09 | End: 2025-01-07

## 2024-10-09 NOTE — PROGRESS NOTES
An interactive audio and video telecommunication system which permits real time communications between the patient (at the originating site) and provider (at the distant site) was utilized to provide this telehealth service.   Verbal consent was requested and obtained from Mitul Veneags on this date, 10/09/24 for a telehealth visit.     HPI  Darin Venegas is a 24 y.o. male patient with a CC Anxiety (Generalized close social), Depression, and OCD (Obsessive-Compulsive Disorder) presenting to outpatient treatment for a scheduled psych outpatient psychiatric evaluation.   Pt identify self by name, , and address     10/09/2024  Feels significant improvement Zoloft compared Luvox.  States it is the first time he is felt himself in the last 4 yrs. reports better control of compulsive urges with some deficiencies still.  Appetite and sleep remain unaffected.  Anxiety/depression symptoms well-controlled.  Externally, everyone in the family is doing well except for dog who seems to be losing patches of hair, will be taking it to the vet for further workup.  Otherwise, denies substance use denies any side effects from Zoloft.  Denies SI/HI.  Denies irritable mood.  Denies hallucinations/delusions/dwayne/hypomania.  Overall feels much improved, but not optimal.    Current S/Sx:  -Mood: unstable and anxious  -Depressive mood: denies  -Fatigue/Energy: good  -Feeling hope/help/worthless: feels somewhat helpless from anxiety  -Sleep: sleeps about 7-9 hours/night.   -Motivation: average   -Appetite/Weight Changes: good appetite, binge eats at times, between 6-10 pm, no weight changes  -Psychosis: denies hallucinations/delusions  -SI/HI: Denies current suicidal intent/plan  -Guns/Weapons at home: guns present at home, locked up in a safe place     -Worry excessively: reports difficulty with social anxiety including engaging in large crowds. Denies fear of leaving the house or going to the store or driving but has a  significant fear of heights.   -Difficulty controlling worry: yes  -Easily fatigued d/t worry: denies  -Poor concentration d/t worry: yes  -Sleep disturbance d/t worry: denies  -Easy irritability d/t worry: denies  -Restless / feeling on edge d/t worry: yes  -DEAN-7 (5)     Panic attacks  denies     HISTORY  PSYCH HISTORY  -Psych Hx: DEAN, MDD, OCD  -Psych Hospitalization Hx: denies  -Suicide Attempt Hx: denies  -Self-Harm/Violence Hx: denies  -Current psych meds: Paxil 40 mg daily  -Psych Med Hx: Lexapro (fixed throwing up while in school and constantly shaking leg. Unsure why it was stopped)     SUBSTANCE USE HISTORY  -Substance Use Hx: denies  -ETOH: rarely  -Tobacco: denies  -Caffeine: rarely  -Substance Abuse Treatment Hx: denies     FAMILY HISTORY  -Family Psych Hx: brother: ADHD, bipolar 2??, mom: profound OCD, maternal GF: severe dementia  -Family Suicide Hx: Paternal GGF  -Family Substance Abuse Hx: denies     SOCIAL HISTORY  -Upbringing: Grew up with both parents. Has 1 brother/sister  -Support system: good  -Trauma: denies  -Education: high school  -Work: previous  with father, stopped working, currently unemployed  -Marital Status: single  -Children: none  -Living situation: lives with parents and grandparents  -: denies  -Legal: denies      MEDICAL HISTORY  -PCP: Laurie Barr DO  -TBI/head trauma/LOC/seizure hx: denies     REVIEW OF SYSTEMS  Review of Systems   All other systems reviewed and are negative.       PHYSICAL EXAM  Physical Exam  Psychiatric:         Attention and Perception: Attention and perception normal.         Mood and Affect: Affect normal. Mood is anxious.         Speech: Speech normal.         Behavior: Behavior normal. Behavior is cooperative.         Thought Content: Thought content normal.         Cognition and Memory: Cognition and memory normal.          IMPRESSION  Anxiety (Generalized close social), Depression, and OCD (Obsessive-Compulsive  Disorder)    Notes significant improvement with anxiety, depression, and obsessive/compulsive thoughts/actions but not optimal.  Reports much better control over edges relating to obsession with germs/dirt/contamination.  Denies irritable mood. No hallucinations/delusion/dwayne/hypomania/SI reported. Appetite and sleep remain unaffected. No side effects or substance use concerns at this time. Discussed to increase Zoloft and reevaluate in 8 weeks.  If patient remains stable during the next appointment, atrophic appointment will be scheduled following, after which patient will be referred back to PCP.     SI/HI ASSESSMENT  -Risk Assessment: Mitul Venegas is currently a low acute risk of suicide and self-harm due to no past suicide attempt(s) and not currently endorsing thoughts of suicide.   -Suicidal Risk Factors: , male, and unmarried/single  -Protective Factors: strong coping skills and hopefulness/future orientation  -Plan to Reduce Risk: increase coping skills .     PLAN  Reviewed diagnostic impression including subjective and objective data and provided education about Anxiety, DEAN, and OCD, etiology, treatment recommendations including medication, therapy, course of treatment and prognosis. Patient amenable to treatment plan.      Dx: Anxiety (Generalized close social), Depression, and OCD (Obsessive-Compulsive Disorder)  INCREASE Zoloft 150 mg daily     Reviewed r/b/a, possible side effects of the medication. Client is aware about the benefit outweighs the risk.     Psychotherapy: Referral sent    Labs reviewed      -Follow-up with this provider in 5 weeks.    - Follow up with physical health providers as scheduled  -Risks/benefits/assessment of medication interventions discussed with pt; pt agreeable to plan. Will continue to monitor for symptoms mgmt and SEs and adjust plan as needed.  -MI to increase coping skills/behavior regulation.  -Safety plan reviewed.  -Call  Psychiatry at [phone] with issues.  -For [facility] residents, [employer] is a 24/7 hotline you can call for assistance at [phone]) 366-6660. Please call 911 or go to your closest Emergency Room if you feel worse. This includes thoughts of hurting yourself or anyone else, or having other troubles such as hearing voices, seeing visions, or having new and scary thoughts about the people around you.

## 2024-11-05 DIAGNOSIS — F42.2 MIXED OBSESSIONAL THOUGHTS AND ACTS: ICD-10-CM

## 2024-11-05 DIAGNOSIS — F41.1 GENERALIZED ANXIETY DISORDER: ICD-10-CM

## 2024-11-05 DIAGNOSIS — F33.0 MILD RECURRENT MAJOR DEPRESSION (CMS-HCC): ICD-10-CM

## 2024-11-05 RX ORDER — SERTRALINE HYDROCHLORIDE 100 MG/1
200 TABLET, FILM COATED ORAL 2 TIMES DAILY
Qty: 180 TABLET | Refills: 1 | Status: SHIPPED | OUTPATIENT
Start: 2024-11-05 | End: 2024-11-06

## 2024-11-06 RX ORDER — SERTRALINE HYDROCHLORIDE 100 MG/1
200 TABLET, FILM COATED ORAL 2 TIMES DAILY
Qty: 180 TABLET | Refills: 1 | Status: SHIPPED | OUTPATIENT
Start: 2024-11-06

## 2024-11-30 DIAGNOSIS — F42.2 MIXED OBSESSIONAL THOUGHTS AND ACTS: ICD-10-CM

## 2024-11-30 DIAGNOSIS — F41.1 GENERALIZED ANXIETY DISORDER: ICD-10-CM

## 2024-11-30 DIAGNOSIS — F33.0 MILD RECURRENT MAJOR DEPRESSION (CMS-HCC): ICD-10-CM

## 2024-12-02 RX ORDER — SERTRALINE HYDROCHLORIDE 100 MG/1
200 TABLET, FILM COATED ORAL 2 TIMES DAILY
Qty: 360 TABLET | Refills: 1 | Status: SHIPPED | OUTPATIENT
Start: 2024-12-02

## 2024-12-04 ENCOUNTER — APPOINTMENT (OUTPATIENT)
Dept: BEHAVIORAL HEALTH | Facility: CLINIC | Age: 24
End: 2024-12-04
Payer: COMMERCIAL

## 2025-06-21 NOTE — PROGRESS NOTES
Subjective   Patient ID: Mitul Venegas is a 23 y.o. male who presents for Anxiety and Depression (Recheck ).    HPI   Mood: stable and improving but would like to increase SSRI. Still washing hands but not as much. Still w/ some fear of germs and not leaving house much. Appetite good. Signed up for on line classes in summer. No SI/HI.     Review of Systems   Constitutional:  Negative for fatigue.   Respiratory:  Negative for shortness of breath.    Cardiovascular:  Negative for chest pain and palpitations.   Gastrointestinal:  Negative for abdominal pain, diarrhea, nausea and vomiting.   Neurological:  Negative for dizziness and headaches.   Psychiatric/Behavioral:  Negative for sleep disturbance.        Objective   /80   Pulse 83   Temp 36.2 °C (97.2 °F)   Wt 68 kg (150 lb)   SpO2 97%   BMI 22.48 kg/m²     Physical Exam  Vitals and nursing note reviewed.   Constitutional:       General: He is not in acute distress.     Appearance: Normal appearance. He is not toxic-appearing.   HENT:      Head: Normocephalic.   Eyes:      General: No scleral icterus.     Pupils: Pupils are equal, round, and reactive to light.   Cardiovascular:      Rate and Rhythm: Normal rate and regular rhythm.      Heart sounds: No murmur heard.  Pulmonary:      Effort: Pulmonary effort is normal. No respiratory distress.      Breath sounds: Normal breath sounds.   Abdominal:      General: Bowel sounds are normal.      Palpations: Abdomen is soft.      Tenderness: There is no abdominal tenderness. There is no guarding.   Musculoskeletal:      Right lower leg: No edema.      Left lower leg: No edema.   Skin:     General: Skin is warm.   Neurological:      General: No focal deficit present.      Mental Status: He is alert.      Cranial Nerves: No cranial nerve deficit.   Psychiatric:         Mood and Affect: Mood normal.         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         Injury of eyebrow

## 2025-07-17 NOTE — TELEPHONE ENCOUNTER
Called pt and lmom for pt to cb to schedule  Pt has not called back  Message sent to [place]

## 2025-07-18 RX ORDER — PAROXETINE 40 MG/1
40 TABLET, FILM COATED ORAL EVERY MORNING
Qty: 30 TABLET | OUTPATIENT
Start: 2025-07-18